# Patient Record
Sex: MALE | Race: BLACK OR AFRICAN AMERICAN
[De-identification: names, ages, dates, MRNs, and addresses within clinical notes are randomized per-mention and may not be internally consistent; named-entity substitution may affect disease eponyms.]

---

## 2019-02-22 ENCOUNTER — HOSPITAL ENCOUNTER (EMERGENCY)
Dept: HOSPITAL 25 - ED | Age: 34
Discharge: HOME | End: 2019-02-22
Payer: SELF-PAY

## 2019-02-22 VITALS — SYSTOLIC BLOOD PRESSURE: 107 MMHG | DIASTOLIC BLOOD PRESSURE: 68 MMHG

## 2019-02-22 DIAGNOSIS — F11.23: Primary | ICD-10-CM

## 2019-02-22 PROCEDURE — 99282 EMERGENCY DEPT VISIT SF MDM: CPT

## 2019-02-22 NOTE — ED
Complex/Multi-Sys Presentation





- HPI Summary


HPI Summary: 





Pt is a 32 y/o M presenting to the ED with a chief complaint of opiate 

withdrawal symptoms. The pt states he has been taking 2 percocet a day for 2 

months for shoulder dislocation. He reports watery bowel movements, fever, 

diaphoresis, abd cramps, and arthralgia. He also cannot sleep for more than 30 

minutes at a time.





- History Of Current Complaint


Chief Complaint: EDDetoxRequest


Time Seen by Provider: 02/22/19 10:18


Hx Obtained From: Patient


Onset/Duration: Gradual Onset, Lasting Days, Still Present


Timing: Constant


Severity Currently: Moderate


Severity Initially: Moderate


Location: Pain At: - joints, abd


Associated Signs And Symptoms: Positive: Diarrhea, Abdominal Pain, Decreased 

Oral Intake, Fever, Diaphoresis, Other - sleep disturbances





- Allergies/Home Medications


Allergies/Adverse Reactions: 


 Allergies











Allergy/AdvReac Type Severity Reaction Status Date / Time


 


No Known Allergies Allergy   Verified 02/22/19 09:21














PMH/Surg Hx/FS Hx/Imm Hx


Previously Healthy: Yes


Endocrine/Hematology History: 


   Denies: Hx Diabetes


Musculoskeletal History: Reports: Other Musculoskeletal History - shoulder 

dislocation


Infectious Disease History: No


Infectious Disease History: 


   Denies: Traveled Outside the [place] in Last 30 Days





- Family History


Known Family History: 


   Negative: Diabetes





- Social History


Alcohol Use: None


Substance Use Type: Reports: Prescribed


Substance Use Comment - Amount & Last Used: Excessive oxycodone use


Smoking Status (MU): Never Smoked Tobacco





Review of Systems


Positive: Fever, Skin Diaphoresis


Positive: Abdominal Pain, Diarrhea


Positive: Arthralgia, Myalgia


All Other Systems Reviewed And Are Negative: Yes





Physical Exam





- Summary


Physical Exam Summary: 





See COWS.


Appearance: The patient is well-nourished is mildly irritable.


Skin: The skin is diaphoretic and skin color reflects adequate perfusion


HEENT: The head is normocephalic and atraumatic. The pupils are equal and 

reactive. The conjunctivae are clear and without drainage. Some nasocongestion 

is present. Mouth reveals moist mucous membranes and the throat is without 

erythema and exudate. The external ears are intact. The ear canals are patent 

and without drainage. The tympanic membranes are intact.


Neck: The neck is supple with full range of motion and non-tender. There are no 

carotid bruits. There is no neck vein distension.


Respiratory: Chest is non-tender. Lungs are clear to auscultation and breath 

sounds are symmetrical and equal.


Cardiovascular: Heart is regular rate and rhythm. There is no murmur or rub 

auscultated. There is no peripheral edema and pulses are symmetrical and equal.


Abdomen: The abdomen is soft and non-tender. There are normal bowel sounds 

heard in all four quadrants and there is no organomegaly palpated.


Musculoskeletal: There is no back tenderness noted. Extremities are non-tender 

with full range of motion. There is good capillary refill. There is no 

peripheral edema or calf tenderness elicited.


Neurological: Patient is alert and oriented to person, place and time. The 

patient has symmetrical motor strength in all four extremities. Cranial nerves 

are grossly intact. Deep tendon reflexes are symmetrical and equal in all four 

extremities.


Psychiatric: The patient has an appropriate affect and does not exhibit any 

anxiety or depression. 





Triage Information Reviewed: Yes


Vital Signs On Initial Exam: 


 Initial Vitals











Temp Pulse Resp BP Pulse Ox


 


 101.5 F   83   22   149/88   97 


 


 02/22/19 09:16  02/22/19 09:16  02/22/19 09:16  02/22/19 09:16  02/22/19 09:16











Vital Signs Reviewed: Yes





Diagnostics





- Vital Signs


 Vital Signs











  Temp Pulse Resp BP Pulse Ox


 


 02/22/19 09:16  101.5 F  83  22  149/88  97














- Laboratory


Lab Statement: Any lab studies that have been ordered have been reviewed, and 

results considered in the medical decision making process.





Complex Multi-Symp Course/Dx


Course Of Treatment: Mr. Dueñas presented with a concern for opiate withdrawal.

  He had an injury a couple months ago and has been taking daily Percocet.  

That was stopped a few days ago and he comes in quite symptomatic in 

withdrawal.  His cows is 14.  I discussed the situation with him.  It's my 

feeling that he inadvertently became dependent on narcotics but certainly has 

no evidence for addiction.  It was my recommendation that we treat him 

symptomatically and try to get him through withdrawal and have that be the end 

of it.  The other option is to start Suboxone and in his current state of mind 

he is choosing to start the Suboxone to become symptom-free quickly.  He was 

given 4 mg of Suboxone and got significant relief.  We were then able to have a 

little more rational conversation and again I recommended that we work together 

to get him off of narcotics including the Suboxone but that temporarily we 

should continue the Suboxone.  He required 2 more doses until he was feeling 

normal.  I will see him next Wednesday at [facility] and gave him a prescription for 

Suboxone until then.





- Diagnoses


Provider Diagnoses: 


 Opiate withdrawal








Discharge





- Sign-Out/Discharge


Documenting (check all that apply): Patient Departure - Discharge


Patient Received Moderate/Deep Sedation with Procedure: No





- Discharge Plan


Condition: Stable


Disposition: HOME


Prescriptions: 


Buprenorp/Nalox 8-2 MG FILM [Suboxone 8 mg-2 mg Sl Film] 1 each SL BID #10 film 

MDD 2


Patient Education Materials:  Opioid Withdrawal (ED)


Referrals: 


Nestor Wilkinson MD [Emergency Provider] - 


Additional Instructions: 


Please follow up with me, Dr. Wilkinson, this coming Wednesday morning at 9:40am at 

[facility].





Return to the ED with any new or worsening symptoms.





- Billing Disposition and Condition


Condition: STABLE


Disposition: Home





- Attestation Statements


Document Initiated by Scribe: Yes


Documenting Scribe: Mary Gonzales


Provider For Whom Iselaibe is Documenting (Include Credential): Nestor Wilkinson MD.


Scribe Attestation: 


Mary HARRY, scribed for Nestor Wilkinson MD. on 02/22/19 at 2236. 


Scribe Documentation Reviewed: Yes


Provider Attestation: 


The documentation as recorded by the scribeMary accurately 

reflects the service I personally performed and the decisions made by me, 

Nestor Wilkinson MD.


Status of Scribe Document: Viewed